# Patient Record
Sex: FEMALE | Race: BLACK OR AFRICAN AMERICAN
[De-identification: names, ages, dates, MRNs, and addresses within clinical notes are randomized per-mention and may not be internally consistent; named-entity substitution may affect disease eponyms.]

---
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
## 2020-09-20 ENCOUNTER — HOSPITAL ENCOUNTER (EMERGENCY)
Dept: HOSPITAL 62 - ER | Age: 20
Discharge: HOME | End: 2020-09-20
Payer: MEDICAID

## 2020-09-20 VITALS — DIASTOLIC BLOOD PRESSURE: 77 MMHG | SYSTOLIC BLOOD PRESSURE: 150 MMHG

## 2020-09-20 DIAGNOSIS — L30.9: Primary | ICD-10-CM

## 2020-09-20 PROCEDURE — 99283 EMERGENCY DEPT VISIT LOW MDM: CPT

## 2020-09-20 NOTE — ER DOCUMENT REPORT
HPI





- HPI


Patient complains to provider of: Skin rash


Time Seen by Provider: 09/20/20 23:16


Pain Level: 3


Context: 





19-year-old female past medical history significant for migraines presents to 

the emergency room complaining of dry patchy areas of skin to both of her hands 

lateral aspect just below the fifth finger on both hands.  States she first 

noticed it approximately a month ago on the right and now she has 1 on the left 

in the same spot that started approximately 1 week ago she denies any changes in

soaps, creams, lotions, no new foods.  States she does work at [employer] but 

has not come in contact with any chemicals that she is aware of.  Has tried 

using over-the-counter antibiotic ointment and antifungal cream without relief. 

States is only painful to touch.  States they started out as dry patches of skin

and then peel open.  There is been no discharge or draining.  No fevers.


Associated Symptoms: None


Exacerbated by: Other - Touching the areas


Relieved by: Remaining still


Similar symptoms previously: No


Recently seen / treated by doctor: No





- ROS


Systems Reviewed and Negative: Yes All other systems reviewed and negative





- CONSTITUTIONAL


Constitutional: DENIES: Fever





- NEURO


Neurology: DENIES: Weakness





- REPRODUCTIVE


LMP: 1 wk


Reproductive: DENIES: Pregnant:





- MUSCULOSKELETAL


Musculoskeletal: REPORTS: Extremity pain





- DERM


Skin Color: Erythema


Skin Problems: Rash





Past Medical History





- General


Information source: Patient





- Social History


Smoking Status: Never Smoker


Frequency of alcohol use: None


Drug Abuse: None


Family History: Reviewed & Not Pertinent





- Past Medical History


Cardiac Medical History: 


   Denies: Hx Heart Attack, Hx Hypertension


Pulmonary Medical History: 


   Denies: Hx Asthma


Neurological Medical History: Reports: Hx Migraine.  Denies: Hx Cerebrovascular 

Accident, Hx Seizures


Renal/ Medical History: Denies: Hx Peritoneal Dialysis


GI Medical History: Denies: Hx Hepatitis, Hx Hiatal Hernia, Hx Ulcer


Infectious Medical History: Denies: Hx Hepatitis


Past Surgical History: Reports: Hx Tonsillectomy.  Denies: Hx Hysterectomy, Hx 

Mastectomy, Hx Open Heart Surgery, Hx Pacemaker





- Immunizations


Immunizations up to date: Yes





Vertical Provider Document





- CONSTITUTIONAL


Agree With Documented VS: Yes


Exam Limitations: No Limitations





- INFECTION CONTROL


TRAVEL OUTSIDE OF THE [place] IN LAST 30 DAYS: No





- HEENT


HEENT: Atraumatic, Normocephalic





- NECK


Neck: Normal Inspection, Supple, Thyroid Normal





- RESPIRATORY


Respiratory: Breath Sounds Normal, No Respiratory Distress, Chest Non-Tender





- CARDIOVASCULAR


Cardiovascular: Regular Rate, Regular Rhythm, No Murmur





- MUSCULOSKELETAL/EXTREMETIES


Musculoskeletal/Extremeties: FROM, Non-Tender





- NEURO


Level of Consciousness: Awake, Alert, Appropriate


Motor/Sensory: No Motor Deficit, No Sensory Deficit





- DERM


Integumentary: Warm, Dry, Rash - Along the lateral aspects of both of her hands 

proximal to the fifth finger on the right hand there is a 2 cm area of dry 

patchy skin.  It is not warm or tender to palpation.  On the left hand there is 

a 1 cm area also of dry patchy skin.  Both areas are nontender to palpation.  

Not warm to touch.  No active discharge or draining noted.





Course





- Re-evaluation


Re-evalutation: 





09/20/20 23:24


Counseled patient to use over-the-counter Eucerin cream as directed.  Outpatient

follow-up with a primary care physician if not improving in 2 to 3 days.  On-

call physician was provided.  Patient was given strict return to the emergency 

room guidelines.  Return for any new or worsening symptoms.  All questions were 

answered.  Patient verbalized understanding and agrees with plan of care.





- Vital Signs


Vital signs: 


                                        











Temp Pulse Resp BP Pulse Ox


 


 98.4 F   88   14   150/77 H  100 


 


 09/20/20 22:37  09/20/20 22:37  09/20/20 22:37  09/20/20 22:37  09/20/20 22:37














Discharge





- Discharge


Clinical Impression: 


 Rash and nonspecific skin eruption





Eczema


Qualifiers:


 Eczema type: unspecified Qualified Code(s): L30.9 - Dermatitis, unspecified





Condition: Stable


Disposition: HOME, SELF-CARE


Instructions:  Atopic Dermatitis (Eczema) ([facility])


Additional Instructions: 


Use over-the-counter Eucerin as directed.  Outpatient follow-up with primary 

care physician as discussed.  Return to the emergency room for any new or 

worsening symptoms.


Referrals: 


DAVEY MURO MD [Primary Care Provider] - Follow up as needed


SHIRA BRAVO MD [COMMUNITY BASED STAFF] - Follow up as needed